# Patient Record
Sex: MALE | Race: BLACK OR AFRICAN AMERICAN | NOT HISPANIC OR LATINO | Employment: UNEMPLOYED | URBAN - NONMETROPOLITAN AREA
[De-identification: names, ages, dates, MRNs, and addresses within clinical notes are randomized per-mention and may not be internally consistent; named-entity substitution may affect disease eponyms.]

---

## 2019-01-17 ENCOUNTER — APPOINTMENT (EMERGENCY)
Dept: CT IMAGING | Facility: HOSPITAL | Age: 39
End: 2019-01-17

## 2019-01-17 ENCOUNTER — HOSPITAL ENCOUNTER (EMERGENCY)
Facility: HOSPITAL | Age: 39
Discharge: HOME | End: 2019-01-17
Attending: EMERGENCY MEDICINE | Admitting: EMERGENCY MEDICINE

## 2019-01-17 VITALS
SYSTOLIC BLOOD PRESSURE: 153 MMHG | RESPIRATION RATE: 16 BRPM | TEMPERATURE: 99 F | HEART RATE: 110 BPM | OXYGEN SATURATION: 98 % | DIASTOLIC BLOOD PRESSURE: 97 MMHG | WEIGHT: 254.19 LBS | BODY MASS INDEX: 32.64 KG/M2

## 2019-01-17 DIAGNOSIS — R51.9 FACIAL PAIN: Primary | ICD-10-CM

## 2019-01-17 DIAGNOSIS — K08.89 DENTALGIA: ICD-10-CM

## 2019-01-17 DIAGNOSIS — R68.84 PAIN IN LOWER JAW: ICD-10-CM

## 2019-01-17 LAB
ANION GAP BLD CALC-SCNC: 18 MMOL/L (ref 4–13)
BUN BLD-MCNC: 7 MG/DL (ref 5–25)
CA-I BLD-SCNC: 1.23 MMOL/L (ref 1.12–1.32)
CHLORIDE BLD-SCNC: 104 MMOL/L (ref 100–108)
CREAT BLD-MCNC: 1 MG/DL (ref 0.6–1.3)
GFR SERPL CREATININE-BSD FRML MDRD: 108 ML/MIN/1.73SQ M
GLUCOSE SERPL-MCNC: 95 MG/DL (ref 65–140)
HCT VFR BLD CALC: 48 % (ref 36.5–49.3)
HGB BLDA-MCNC: 16.3 G/DL (ref 12–17)
PCO2 BLD: 25 MMOL/L (ref 21–32)
POTASSIUM BLD-SCNC: 4 MMOL/L (ref 3.5–5.3)
SODIUM BLD-SCNC: 143 MMOL/L (ref 136–145)
SPECIMEN SOURCE: ABNORMAL

## 2019-01-17 PROCEDURE — 99284 EMERGENCY DEPT VISIT MOD MDM: CPT

## 2019-01-17 PROCEDURE — 70491 CT SOFT TISSUE NECK W/DYE: CPT

## 2019-01-17 PROCEDURE — 80047 BASIC METABLC PNL IONIZED CA: CPT

## 2019-01-17 PROCEDURE — 85014 HEMATOCRIT: CPT

## 2019-01-17 RX ORDER — ACETAMINOPHEN 325 MG/1
650 TABLET ORAL ONCE
Status: COMPLETED | OUTPATIENT
Start: 2019-01-17 | End: 2019-01-17

## 2019-01-17 RX ORDER — IBUPROFEN 400 MG/1
400 TABLET ORAL ONCE
Status: COMPLETED | OUTPATIENT
Start: 2019-01-17 | End: 2019-01-17

## 2019-01-17 RX ORDER — OXYCODONE HYDROCHLORIDE 5 MG/1
5 TABLET ORAL EVERY 4 HOURS PRN
Qty: 6 TABLET | Refills: 0 | Status: SHIPPED | OUTPATIENT
Start: 2019-01-17

## 2019-01-17 RX ORDER — IBUPROFEN 600 MG/1
600 TABLET ORAL EVERY 6 HOURS PRN
Qty: 30 TABLET | Refills: 0 | Status: SHIPPED | OUTPATIENT
Start: 2019-01-17

## 2019-01-17 RX ORDER — AMOXICILLIN AND CLAVULANATE POTASSIUM 875; 125 MG/1; MG/1
1 TABLET, FILM COATED ORAL ONCE
Status: DISCONTINUED | OUTPATIENT
Start: 2019-01-17 | End: 2019-01-17 | Stop reason: HOSPADM

## 2019-01-17 RX ORDER — ACETAMINOPHEN 325 MG/1
650 TABLET ORAL EVERY 6 HOURS PRN
Qty: 1 BOTTLE | Refills: 0 | Status: SHIPPED | OUTPATIENT
Start: 2019-01-17

## 2019-01-17 RX ORDER — IBUPROFEN 800 MG/1
800 TABLET ORAL 3 TIMES DAILY
Qty: 30 TABLET | Refills: 0 | Status: SHIPPED | OUTPATIENT
Start: 2019-01-17

## 2019-01-17 RX ORDER — PENICILLIN V POTASSIUM 500 MG/1
500 TABLET ORAL 4 TIMES DAILY
Qty: 20 TABLET | Refills: 0 | Status: SHIPPED | OUTPATIENT
Start: 2019-01-17 | End: 2019-01-27

## 2019-01-17 RX ORDER — HYDROCODONE BITARTRATE AND ACETAMINOPHEN 5; 325 MG/1; MG/1
1 TABLET ORAL ONCE
Status: COMPLETED | OUTPATIENT
Start: 2019-01-17 | End: 2019-01-17

## 2019-01-17 RX ADMIN — HYDROCODONE BITARTRATE AND ACETAMINOPHEN 1 TABLET: 5; 325 TABLET ORAL at 19:10

## 2019-01-17 RX ADMIN — IOHEXOL 85 ML: 350 INJECTION, SOLUTION INTRAVENOUS at 17:35

## 2019-01-17 RX ADMIN — ACETAMINOPHEN 650 MG: 325 TABLET, FILM COATED ORAL at 18:25

## 2019-01-17 RX ADMIN — IBUPROFEN 400 MG: 400 TABLET ORAL at 18:25

## 2019-01-17 NOTE — ED NOTES
Had a tooth worked on in December, now feels pain in his neck by the lymph nodes   He thinks it is an "abscess "     Aiden Yeh RN  01/17/19 1797

## 2019-01-17 NOTE — ED NOTES
Patient was given 400mg of Motrin and 650mg of Tylenol for mouth pain  There was no response to the pain med and is states that his pain is going up 8/10       Sheila Taylor RN  01/17/19 6148

## 2019-01-18 NOTE — ED NOTES
Went to get patient medication and was told he was discharged  When I returned the patient was not in the room  Attempted to catch him out side before he left, but he was no where around       Radha Escoto RN  01/17/19 7126

## 2019-01-18 NOTE — ED PROVIDER NOTES
History  Chief Complaint   Patient presents with    Facial Pain     Patient states for the last 10 days he had right sided facial pain  PAtient thinks the pain is comming for a dental problem  Patient states he had dental work around the middle of December 2018  Patient: Christopher Quevedo  38 y o /male  YOB: 1980  MRN: [id number]  PCP: No primary care provider on file  Date of evaluation: 1/17/2019    (N B   [address] may have been used in the preparation of this document  Occasional wrong word or "sound-alike" substitutions may have occurred due to the inherent limitations of voice recognition software  Interpretation should be guided by context )    10 d of right facial pain  Began to the right of his nose, then moved on to his right ear, then to the underside of his right jaw  He now feels a mass just below his right jaw  Had dental work in December  Does not remind him of pain of exposed nerve  History provided by:  Patient      None       History reviewed  No pertinent past medical history  Past Surgical History:   Procedure Laterality Date    FEMUR FRACTURE SURGERY      TONSILLECTOMY AND ADENOIDECTOMY         History reviewed  No pertinent family history  I have reviewed and agree with the history as documented  Social History   Substance Use Topics    Smoking status: Current Every Day Smoker     Types: Cigars    Smokeless tobacco: Never Used    Alcohol use No        Review of Systems   Constitutional: Negative  Negative for chills and fever  HENT: Negative  Negative for rhinorrhea, sore throat, trouble swallowing and voice change  Eyes: Negative for photophobia and visual disturbance  Respiratory: Negative  Negative for cough and shortness of breath  Cardiovascular: Negative  Negative for chest pain and palpitations  Gastrointestinal: Negative  Negative for abdominal pain and vomiting  Endocrine: Negative    Negative for polydipsia and polyuria  Genitourinary: Negative  Negative for difficulty urinating and urgency  Musculoskeletal: Negative  Negative for back pain and neck pain  Skin: Negative  Negative for rash and wound  Allergic/Immunologic: Negative for immunocompromised state  Neurological: Negative  Negative for speech difficulty and weakness  Hematological: Negative  Negative for adenopathy  Does not bruise/bleed easily  All other systems reviewed and are negative  Physical Exam  Physical Exam   Constitutional: He appears well-developed and well-nourished  HENT:   Head: Normocephalic and atraumatic  Mouth/Throat: Uvula is midline and oropharynx is clear and moist        Eyes: Pupils are equal, round, and reactive to light  EOM are normal    Neck: Neck supple  Cardiovascular: Normal rate and regular rhythm  Pulmonary/Chest: Effort normal and breath sounds normal    Abdominal: Soft  There is no tenderness  Neurological: He is alert  GCS eye subscore is 4  GCS verbal subscore is 5  GCS motor subscore is 6  Psychiatric: He has a normal mood and affect  His speech is normal and behavior is normal    Nursing note and vitals reviewed        Vital Signs  ED Triage Vitals [01/17/19 1613]   Temperature Pulse Respirations Blood Pressure SpO2   99 °F (37 2 °C) (!) 110 16 153/97 98 %      Temp Source Heart Rate Source Patient Position - Orthostatic VS BP Location FiO2 (%)   Temporal Monitor Sitting Right arm --      Pain Score       7           Vitals:    01/17/19 1613   BP: 153/97   Pulse: (!) 110   Patient Position - Orthostatic VS: Sitting       Visual Acuity      ED Medications  Medications   iohexol (OMNIPAQUE) 350 MG/ML injection (SINGLE-DOSE) 85 mL (85 mL Intravenous Given 1/17/19 1735)   ibuprofen (MOTRIN) tablet 400 mg (400 mg Oral Given 1/17/19 1825)   acetaminophen (TYLENOL) tablet 650 mg (650 mg Oral Given 1/17/19 1825)   HYDROcodone-acetaminophen (NORCO) 5-325 mg per tablet 1 tablet (1 tablet Oral Given 1/17/19 1910)       Diagnostic Studies  Results Reviewed     Procedure Component Value Units Date/Time    POCT Chem 8+ [[id number]]  (Abnormal) Collected:  01/17/19 1647    Lab Status:  Final result Updated:  01/17/19 1652     SODIUM, I-STAT 143 mmol/l      Potassium, i-STAT 4 0 mmol/L      Chloride, istat 104 mmol/L      CO2, i-STAT 25 mmol/L      Anion Gap, i-STAT 18 (H) mmol/L      Calcium, Ionized i-STAT 1 23 mmol/L      BUN, I-STAT 7 mg/dl      Creatinine, i-STAT 1 0 mg/dl      eGFR 108 ml/min/1 73sq m      Glucose, i-STAT 95 mg/dl      Hct, i-STAT 48 %      Hgb, i-STAT 16 3 g/dl      Specimen Type VENOUS                 CT soft tissue neck with contrast   Final Result by Stephanie Wyman MD (01/17 1755)      No evidence of soft tissue abscess, mass or lymphadenopathy  Workstation performed: [id number]                    Procedures  Procedures       Phone Contacts  ED Phone Contact    ED Course                               MDM  Number of Diagnoses or Management Options  [facility]:   Facial pain:   Pain in lower jaw:      Amount and/or Complexity of Data Reviewed  Clinical lab tests: ordered and reviewed  Tests in the radiology section of CPT®: ordered and reviewed    Patient Progress  Patient progress: stable    CritCare Time    Disposition  Final diagnoses:   Facial pain   Pain in lower jaw   Dentalgia     Time reflects when diagnosis was documented in both MDM as applicable and the Disposition within this note     Time User Action Codes Description Comment    1/17/2019  7:04 PM Adia Lopes Add [R51] Facial pain     1/17/2019  7:04 PM Raegan Dwyer Add [R68 84] Pain in lower jaw     1/17/2019  7:29 PM Jagruti LINTON Add [K08 89] [facility]       ED Disposition     ED Disposition Condition Comment    Discharge  Aleksandr Coppola discharge to home/self care      Condition at discharge: Good        Follow-up Information     Follow up With Specialties Details Why Contact Info your dentist  Call in 1 day      [employer]  Call in 1 day Say you are following up from an ER visit  [phone]            Discharge Medication List as of 1/17/2019  7:37 PM      START taking these medications    Details   !! acetaminophen (TYLENOL) 325 mg tablet Take 2 tablets (650 mg total) by mouth every 6 (six) hours as needed (pain, fever), Starting Thu 1/17/2019, Print      !! acetaminophen (TYLENOL) 325 mg tablet Take 2 tablets (650 mg total) by mouth every 6 (six) hours as needed (pain, fever), Starting Thu 1/17/2019, Normal      !! ibuprofen (MOTRIN) 600 mg tablet Take 1 tablet (600 mg total) by mouth every 6 (six) hours as needed (pain, fever (= 3 of the 200 mg OTC tablets)), Starting Thu 1/17/2019, Normal      !! ibuprofen (MOTRIN) 800 mg tablet Take 1 tablet (800 mg total) by mouth 3 (three) times a day as needed for pain, fever (= 4 of the 200 mg OTC ibuprofen), Starting Thu 1/17/2019, Print      oxyCODONE (ROXICODONE) 5 mg immediate release tablet Take 1 tablet (5 mg total) by mouth every 4 (four) hours as needed for severe pain for up to 6 doses Max Daily Amount: 30 mg, Starting Thu 1/17/2019, Print      penicillin V potassium (VEETID) 500 mg tablet Take 1 tablet (500 mg total) by mouth 4 (four) times a day for 10 days (antibiotic), Starting Thu 1/17/2019, Until Sun 1/27/2019, Normal       !! - Potential duplicate medications found  Please discuss with provider  No discharge procedures on file      ED Provider  Electronically Signed by           Seamus Garrido MD  01/18/19 7293

## 2019-01-18 NOTE — DISCHARGE INSTRUCTIONS
Atypical Facial Pain   WHAT YOU NEED TO KNOW:   Atypical facial pain usually occurs on one side of your face  The pain is often constant, and may be aching, burning, throbbing, or stabbing  The pain may be felt in your nose, eye, cheek, temple, and jaw  You may also have headaches  DISCHARGE INSTRUCTIONS:   Contact your healthcare provider if:   · Your symptoms get worse, or you develop new symptoms  · You have questions or concerns about your condition or care  Medicines:   · Medicines  such as antidepressants, antiseizure medicines, or muscle relaxers may be used to decrease pain  · Take your medicine as directed  Contact your healthcare provider if you think your medicine is not helping or if you have side effects  Tell him of her if you are allergic to any medicine  Keep a list of the medicines, vitamins, and herbs you take  Include the amounts, and when and why you take them  Bring the list or the pill bottles to follow-up visits  Carry your medicine list with you in case of an emergency  Follow up with your healthcare provider as directed:  Write down your questions so you remember to ask them during your visits  © 2017 [employer]  Information is for End User's use only and may not be sold, redistributed or otherwise used for commercial purposes  All illustrations and images included in CareNotes® are the copyrighted property of [employer]  or [employer]  The above information is an  only  It is not intended as medical advice for individual conditions or treatments  Talk to your doctor, nurse or pharmacist before following any medical regimen to see if it is safe and effective for you

## 2020-01-01 ENCOUNTER — HOSPITAL ENCOUNTER (EMERGENCY)
Facility: HOSPITAL | Age: 40
Discharge: HOME | End: 2020-01-01
Attending: EMERGENCY MEDICINE | Admitting: EMERGENCY MEDICINE

## 2020-01-01 ENCOUNTER — APPOINTMENT (EMERGENCY)
Dept: RADIOLOGY | Facility: HOSPITAL | Age: 40
End: 2020-01-01

## 2020-01-01 VITALS
SYSTOLIC BLOOD PRESSURE: 136 MMHG | WEIGHT: 239.86 LBS | HEART RATE: 99 BPM | DIASTOLIC BLOOD PRESSURE: 87 MMHG | TEMPERATURE: 98.3 F | HEIGHT: 74 IN | OXYGEN SATURATION: 97 % | RESPIRATION RATE: 18 BRPM | BODY MASS INDEX: 30.78 KG/M2

## 2020-01-01 DIAGNOSIS — M62.838 MUSCLE SPASMS OF NECK: Primary | ICD-10-CM

## 2020-01-01 DIAGNOSIS — S16.1XXA ACUTE STRAIN OF NECK MUSCLE, INITIAL ENCOUNTER: ICD-10-CM

## 2020-01-01 PROCEDURE — 72050 X-RAY EXAM NECK SPINE 4/5VWS: CPT

## 2020-01-01 PROCEDURE — 99283 EMERGENCY DEPT VISIT LOW MDM: CPT

## 2020-01-01 PROCEDURE — 99284 EMERGENCY DEPT VISIT MOD MDM: CPT | Performed by: EMERGENCY MEDICINE

## 2020-01-01 PROCEDURE — 96372 THER/PROPH/DIAG INJ SC/IM: CPT

## 2020-01-01 RX ORDER — LIDOCAINE 50 MG/G
1 PATCH TOPICAL DAILY
Qty: 5 PATCH | Refills: 0 | Status: SHIPPED | OUTPATIENT
Start: 2020-01-01 | End: 2020-01-06

## 2020-01-01 RX ORDER — LIDOCAINE 50 MG/G
1 PATCH TOPICAL ONCE
Status: DISCONTINUED | OUTPATIENT
Start: 2020-01-01 | End: 2020-01-01 | Stop reason: HOSPADM

## 2020-01-01 RX ORDER — ACETAMINOPHEN 325 MG/1
650 TABLET ORAL ONCE
Status: COMPLETED | OUTPATIENT
Start: 2020-01-01 | End: 2020-01-01

## 2020-01-01 RX ORDER — KETOROLAC TROMETHAMINE 30 MG/ML
30 INJECTION, SOLUTION INTRAMUSCULAR; INTRAVENOUS ONCE
Status: COMPLETED | OUTPATIENT
Start: 2020-01-01 | End: 2020-01-01

## 2020-01-01 RX ORDER — CYCLOBENZAPRINE HCL 10 MG
10 TABLET ORAL 3 TIMES DAILY PRN
Qty: 15 TABLET | Refills: 0 | Status: SHIPPED | OUTPATIENT
Start: 2020-01-01 | End: 2020-01-06

## 2020-01-01 RX ORDER — CYCLOBENZAPRINE HCL 10 MG
10 TABLET ORAL ONCE
Status: COMPLETED | OUTPATIENT
Start: 2020-01-01 | End: 2020-01-01

## 2020-01-01 RX ADMIN — CYCLOBENZAPRINE HYDROCHLORIDE 10 MG: 10 TABLET, FILM COATED ORAL at 16:59

## 2020-01-01 RX ADMIN — LIDOCAINE 1 PATCH: 50 PATCH TOPICAL at 16:56

## 2020-01-01 RX ADMIN — KETOROLAC TROMETHAMINE 30 MG: 30 INJECTION, SOLUTION INTRAMUSCULAR at 17:02

## 2020-01-01 RX ADMIN — ACETAMINOPHEN 650 MG: 325 TABLET, FILM COATED ORAL at 16:58

## 2020-01-01 RX ADMIN — DEXAMETHASONE SODIUM PHOSPHATE 10 MG: 10 INJECTION, SOLUTION INTRAMUSCULAR; INTRAVENOUS at 16:58

## 2020-01-01 NOTE — ED PROVIDER NOTES
History  Chief Complaint   Patient presents with    Neck Pain     woke 3 days ago with right sided neck/head pain since moved to both sides and into shoulders  Has been taking motrin and excedrine for pain  Patient is a 25-year-old male coming in today with cervical spine pain  Patient states he woke up from sleep several days ago and noted to have right sided cervical spine pain  He has no fevers, chills, ear pain, sore throat  He took Motrin without any relief  The pain continued to get worse  It is worse with movement  He has no fevers, IV drug abuse, chills or trauma  He states that the heat and showed help a little bit but he is afraid to move because the pain is worsened  He has no weakness throughout the bilateral upper extremities  He denies any paresthesias throughout the bilateral upper extremities  He has no slurred speech, facial asymmetry, confusion  Denies any tinnitus or visual amaurosis fugax        History provided by:  Patient   used: No    Neck Pain   Pain location:  Generalized neck  Quality:  Aching, cramping and stiffness  Stiffness is present:  All day  Pain radiates to:  Does not radiate  Pain severity:  Moderate  Pain is:  Unable to specify  Onset quality:  Gradual  Timing:  Constant  Progression:  Unchanged  Chronicity:  New  Context: not fall, not jumping from heights, not lifting a heavy object, not MCA, not MVC, not pedestrian accident and not recent injury    Relieved by:  Nothing  Worsened by:  Position, twisting and bending  Ineffective treatments:  NSAIDs  Associated symptoms: no bladder incontinence, no bowel incontinence, no chest pain, no fever, no headaches, no leg pain, no numbness, no paresis, no photophobia, no syncope, no tingling, no visual change, no weakness and no weight loss    Risk factors: no hx of head and neck radiation, no hx of osteoporosis, no hx of spinal trauma, no recent epidural, no recent head injury and no recurrent falls Prior to Admission Medications   Prescriptions Last Dose Informant Patient Reported? Taking?   acetaminophen (TYLENOL) 325 mg tablet   No No   Sig: Take 2 tablets (650 mg total) by mouth every 6 (six) hours as needed (pain, fever)   acetaminophen (TYLENOL) 325 mg tablet   No No   Sig: Take 2 tablets (650 mg total) by mouth every 6 (six) hours as needed (pain, fever)   ibuprofen (MOTRIN) 600 mg tablet   No No   Sig: Take 1 tablet (600 mg total) by mouth every 6 (six) hours as needed (pain, fever (= 3 of the 200 mg OTC tablets))   ibuprofen (MOTRIN) 800 mg tablet   No No   Sig: Take 1 tablet (800 mg total) by mouth 3 (three) times a day as needed for pain, fever (= 4 of the 200 mg OTC ibuprofen)   oxyCODONE (ROXICODONE) 5 mg immediate release tablet   No No   Sig: Take 1 tablet (5 mg total) by mouth every 4 (four) hours as needed for severe pain for up to 6 doses Max Daily Amount: 30 mg      Facility-Administered Medications: None       History reviewed  No pertinent past medical history  Past Surgical History:   Procedure Laterality Date    FEMUR FRACTURE SURGERY      TONSILLECTOMY AND ADENOIDECTOMY         History reviewed  No pertinent family history  I have reviewed and agree with the history as documented  Social History     Tobacco Use    Smoking status: Current Every Day Smoker     Types: Cigars    Smokeless tobacco: Never Used   Substance Use Topics    Alcohol use: No    Drug use: Yes     Types: Marijuana     Comment: 2-3x a week         Review of Systems   Constitutional: Negative  Negative for diaphoresis, fever and weight loss  HENT: Negative  Negative for ear pain and sore throat  Eyes: Negative  Negative for photophobia and visual disturbance  Respiratory: Negative  Negative for chest tightness and shortness of breath  Cardiovascular: Negative  Negative for chest pain, palpitations and syncope  Gastrointestinal: Negative    Negative for abdominal pain, bowel incontinence, nausea and vomiting  Genitourinary: Negative  Negative for bladder incontinence, difficulty urinating and dysuria  Musculoskeletal: Positive for neck pain  Negative for back pain  Skin: Negative for rash  Neurological: Negative for tingling, weakness, numbness and headaches  Hematological: Negative  Psychiatric/Behavioral: Negative  Negative for confusion  All other systems reviewed and are negative  Physical Exam  Physical Exam   Constitutional: He is oriented to person, place, and time  He appears well-developed and well-nourished  No distress  HENT:   Head: Normocephalic and atraumatic  Mouth/Throat: Oropharynx is clear and moist    Patient maintaining airway maintaining secretions   Eyes: Pupils are equal, round, and reactive to light  Conjunctivae and EOM are normal    Neck: Normal range of motion  Neck supple  Cardiovascular: Normal rate, regular rhythm, normal heart sounds and intact distal pulses  No murmur heard  Pulses:       Radial pulses are 2+ on the right side, and 2+ on the left side  Dorsalis pedis pulses are 2+ on the right side, and 2+ on the left side  Pulmonary/Chest: Effort normal and breath sounds normal  No stridor  No respiratory distress  Abdominal: Soft  Bowel sounds are normal  He exhibits no distension  There is no tenderness  Musculoskeletal: Normal range of motion  He exhibits no edema  Thoracic back: Normal         Lumbar back: Normal         Back:    Neurological: He is alert and oriented to person, place, and time  No cranial nerve deficit  No slurred speech  No facial asymmetry  No ataxia  Negative pronator drift   Skin: Skin is warm  Capillary refill takes less than 2 seconds  He is not diaphoretic  Nursing note and vitals reviewed        Vital Signs  ED Triage Vitals [01/01/20 1644]   Temperature Pulse Respirations Blood Pressure SpO2   98 3 °F (36 8 °C) 99 18 136/87 97 %      Temp src Heart Rate Source Patient Position - Orthostatic VS BP Location FiO2 (%)   -- Monitor Sitting Left arm --      Pain Score       Worst Possible Pain           Vitals:    01/01/20 1644   BP: 136/87   Pulse: 99   Patient Position - Orthostatic VS: Sitting         Visual Acuity      ED Medications  Medications   lidocaine (LIDODERM) 5 % patch 1 patch (1 patch Topical Medication Applied 1/1/20 1656)   ketorolac (TORADOL) injection 30 mg (30 mg Intramuscular Given 1/1/20 1702)   acetaminophen (TYLENOL) tablet 650 mg (650 mg Oral Given 1/1/20 1658)   cyclobenzaprine (FLEXERIL) tablet 10 mg (10 mg Oral Given 1/1/20 1659)   dexamethasone 10 mg/mL oral liquid 10 mg 1 mL (10 mg Oral Given 1/1/20 1658)       Diagnostic Studies  Results Reviewed     None                 XR spine cervical complete 4 or 5 vw non injury   ED Interpretation by Jhony Ansari DO (01/01 1716)   No acute fracture/subluxation                 Procedures  Procedures         ED Course  ED Course as of Jan 01 1718 Wed Jan 01, 2020 1653 Patient is a 68-year-old male coming in today with nontraumatic neck pain  On exam he does have reproducible pain with noted moderate to severe muscular spasm  He does not have any meningeal signs nontoxic  No acute distress  Will give Decadron, Toradol, seated benefit, Flexeril and lidocaine patches  Will also obtain x-ray  Portions of the record may have been created with voice recognition software  Occasional wrong word or "sound a like" substitutions may have occurred due to the inherent limitations of voice recognition software  Read the chart carefully and recognize, using context, where substitutions have occurred  1716 Cervical spine x-rays without acute pathology  Patient does have a moderate amount of cervical spine spasms and strain  Discussed with him using he and then stretching and then ice after  Will discharge home with medications as well                                    MDM      Disposition  Final diagnoses: Muscle spasms of neck   Acute strain of neck muscle, initial encounter     Time reflects when diagnosis was documented in both MDM as applicable and the Disposition within this note     Time User Action Codes Description Comment    1/1/2020  5:00 PM Jun Aver Add [T99 720] Muscle spasms of neck     1/1/2020  5:00 PM Danelle Mcdowell Add [S16  1XXA] Acute strain of neck muscle, initial encounter       ED Disposition     ED Disposition Condition Date/Time Comment    Discharge Stable Wed Jan 1, 2020  5:00 PM Michelle Saa discharge to home/self care  Follow-up Information     Follow up With Specialties Details Why [address], DO Family Medicine   [address] [place] [address],[address]  [address]  [phone]            Patient's Medications   Discharge Prescriptions    CYCLOBENZAPRINE (FLEXERIL) 10 MG TABLET    Take 1 tablet (10 mg total) by mouth 3 (three) times a day as needed for muscle spasms for up to 5 days       Start Date: 1/1/2020  End Date: 1/6/2020       Order Dose: 10 mg       Quantity: 15 tablet    Refills: 0    LIDOCAINE (LIDODERM) 5 %    Apply 1 patch topically daily for 5 days Remove & Discard patch within 12 hours or as directed by MD       Start Date: 1/1/2020  End Date: 1/6/2020       Order Dose: 1 patch       Quantity: 5 patch    Refills: 0     No discharge procedures on file      ED Provider  Electronically Signed by           Tavo Angulo DO  01/01/20 5641

## 2020-01-01 NOTE — DISCHARGE INSTRUCTIONS
USE HEAT BEFORE STRETCHING THEN ICE AFTERWARDS    YOU MUST CONTINUE TO MOVE YOUR NECK OR YOU WILL MAKE SYMPTOMS WORSE    ALTERNATE TYLENOL AND MOTRIN EVERY 8 HOURS AS NEEDED FOR PAIN CONTROL